# Patient Record
Sex: MALE | Race: WHITE | NOT HISPANIC OR LATINO
[De-identification: names, ages, dates, MRNs, and addresses within clinical notes are randomized per-mention and may not be internally consistent; named-entity substitution may affect disease eponyms.]

---

## 2020-02-05 PROBLEM — Z00.00 ENCOUNTER FOR PREVENTIVE HEALTH EXAMINATION: Status: ACTIVE | Noted: 2020-02-05

## 2020-02-25 ENCOUNTER — APPOINTMENT (OUTPATIENT)
Dept: FAMILY MEDICINE | Facility: CLINIC | Age: 55
End: 2020-02-25

## 2023-03-25 ENCOUNTER — EMERGENCY (EMERGENCY)
Facility: HOSPITAL | Age: 58
LOS: 1 days | Discharge: HOME | End: 2023-03-25
Attending: EMERGENCY MEDICINE
Payer: COMMERCIAL

## 2023-03-25 VITALS
DIASTOLIC BLOOD PRESSURE: 67 MMHG | RESPIRATION RATE: 20 BRPM | HEART RATE: 81 BPM | TEMPERATURE: 98 F | OXYGEN SATURATION: 95 % | SYSTOLIC BLOOD PRESSURE: 127 MMHG

## 2023-03-25 VITALS
HEART RATE: 114 BPM | SYSTOLIC BLOOD PRESSURE: 129 MMHG | TEMPERATURE: 99 F | RESPIRATION RATE: 20 BRPM | WEIGHT: 164.91 LBS | HEIGHT: 69 IN | DIASTOLIC BLOOD PRESSURE: 82 MMHG

## 2023-03-25 LAB
ALBUMIN SERPL ELPH-MCNC: 4.5 G/DL — SIGNIFICANT CHANGE UP (ref 3.3–5.2)
ALP SERPL-CCNC: 61 U/L — SIGNIFICANT CHANGE UP (ref 40–120)
ALT FLD-CCNC: 16 U/L — SIGNIFICANT CHANGE UP
ANION GAP SERPL CALC-SCNC: 12 MMOL/L — SIGNIFICANT CHANGE UP (ref 5–17)
APPEARANCE UR: CLEAR — SIGNIFICANT CHANGE UP
APTT BLD: 23.5 SEC — LOW (ref 27.5–35.5)
AST SERPL-CCNC: 27 U/L — SIGNIFICANT CHANGE UP
BACTERIA # UR AUTO: NEGATIVE — SIGNIFICANT CHANGE UP
BASOPHILS # BLD AUTO: 0.09 K/UL — SIGNIFICANT CHANGE UP (ref 0–0.2)
BASOPHILS NFR BLD AUTO: 0.9 % — SIGNIFICANT CHANGE UP (ref 0–2)
BILIRUB SERPL-MCNC: 0.4 MG/DL — SIGNIFICANT CHANGE UP (ref 0.4–2)
BILIRUB UR-MCNC: NEGATIVE — SIGNIFICANT CHANGE UP
BLD GP AB SCN SERPL QL: SIGNIFICANT CHANGE UP
BUN SERPL-MCNC: 13.4 MG/DL — SIGNIFICANT CHANGE UP (ref 8–20)
CALCIUM SERPL-MCNC: 9.2 MG/DL — SIGNIFICANT CHANGE UP (ref 8.4–10.5)
CHLORIDE SERPL-SCNC: 101 MMOL/L — SIGNIFICANT CHANGE UP (ref 96–108)
CO2 SERPL-SCNC: 22 MMOL/L — SIGNIFICANT CHANGE UP (ref 22–29)
COLOR SPEC: YELLOW — SIGNIFICANT CHANGE UP
CREAT SERPL-MCNC: 0.76 MG/DL — SIGNIFICANT CHANGE UP (ref 0.5–1.3)
DIFF PNL FLD: NEGATIVE — SIGNIFICANT CHANGE UP
EGFR: 105 ML/MIN/1.73M2 — SIGNIFICANT CHANGE UP
EOSINOPHIL # BLD AUTO: 0.76 K/UL — HIGH (ref 0–0.5)
EOSINOPHIL NFR BLD AUTO: 7.5 % — HIGH (ref 0–6)
EPI CELLS # UR: SIGNIFICANT CHANGE UP
FLUAV AG NPH QL: SIGNIFICANT CHANGE UP
FLUBV AG NPH QL: SIGNIFICANT CHANGE UP
GLUCOSE SERPL-MCNC: 74 MG/DL — SIGNIFICANT CHANGE UP (ref 70–99)
GLUCOSE UR QL: NEGATIVE MG/DL — SIGNIFICANT CHANGE UP
HCT VFR BLD CALC: 40.6 % — SIGNIFICANT CHANGE UP (ref 39–50)
HGB BLD-MCNC: 13.8 G/DL — SIGNIFICANT CHANGE UP (ref 13–17)
IMM GRANULOCYTES NFR BLD AUTO: 0.2 % — SIGNIFICANT CHANGE UP (ref 0–0.9)
INR BLD: 1.07 RATIO — SIGNIFICANT CHANGE UP (ref 0.88–1.16)
KETONES UR-MCNC: ABNORMAL
LEUKOCYTE ESTERASE UR-ACNC: ABNORMAL
LIDOCAIN IGE QN: 51 U/L — SIGNIFICANT CHANGE UP (ref 22–51)
LYMPHOCYTES # BLD AUTO: 2.41 K/UL — SIGNIFICANT CHANGE UP (ref 1–3.3)
LYMPHOCYTES # BLD AUTO: 23.8 % — SIGNIFICANT CHANGE UP (ref 13–44)
MCHC RBC-ENTMCNC: 30.8 PG — SIGNIFICANT CHANGE UP (ref 27–34)
MCHC RBC-ENTMCNC: 34 GM/DL — SIGNIFICANT CHANGE UP (ref 32–36)
MCV RBC AUTO: 90.6 FL — SIGNIFICANT CHANGE UP (ref 80–100)
MONOCYTES # BLD AUTO: 0.88 K/UL — SIGNIFICANT CHANGE UP (ref 0–0.9)
MONOCYTES NFR BLD AUTO: 8.7 % — SIGNIFICANT CHANGE UP (ref 2–14)
NEUTROPHILS # BLD AUTO: 5.96 K/UL — SIGNIFICANT CHANGE UP (ref 1.8–7.4)
NEUTROPHILS NFR BLD AUTO: 58.9 % — SIGNIFICANT CHANGE UP (ref 43–77)
NITRITE UR-MCNC: NEGATIVE — SIGNIFICANT CHANGE UP
PH UR: 5 — SIGNIFICANT CHANGE UP (ref 5–8)
PLATELET # BLD AUTO: 331 K/UL — SIGNIFICANT CHANGE UP (ref 150–400)
POTASSIUM SERPL-MCNC: 3.8 MMOL/L — SIGNIFICANT CHANGE UP (ref 3.5–5.3)
POTASSIUM SERPL-SCNC: 3.8 MMOL/L — SIGNIFICANT CHANGE UP (ref 3.5–5.3)
PROT SERPL-MCNC: 6.9 G/DL — SIGNIFICANT CHANGE UP (ref 6.6–8.7)
PROT UR-MCNC: 15
PROTHROM AB SERPL-ACNC: 12.4 SEC — SIGNIFICANT CHANGE UP (ref 10.5–13.4)
RBC # BLD: 4.48 M/UL — SIGNIFICANT CHANGE UP (ref 4.2–5.8)
RBC # FLD: 13.5 % — SIGNIFICANT CHANGE UP (ref 10.3–14.5)
RBC CASTS # UR COMP ASSIST: SIGNIFICANT CHANGE UP /HPF (ref 0–4)
RSV RNA NPH QL NAA+NON-PROBE: SIGNIFICANT CHANGE UP
SARS-COV-2 RNA SPEC QL NAA+PROBE: SIGNIFICANT CHANGE UP
SODIUM SERPL-SCNC: 135 MMOL/L — SIGNIFICANT CHANGE UP (ref 135–145)
SP GR SPEC: 1.02 — SIGNIFICANT CHANGE UP (ref 1.01–1.02)
TROPONIN T SERPL-MCNC: <0.01 NG/ML — SIGNIFICANT CHANGE UP (ref 0–0.06)
UROBILINOGEN FLD QL: 1 MG/DL
WBC # BLD: 10.12 K/UL — SIGNIFICANT CHANGE UP (ref 3.8–10.5)
WBC # FLD AUTO: 10.12 K/UL — SIGNIFICANT CHANGE UP (ref 3.8–10.5)
WBC UR QL: SIGNIFICANT CHANGE UP /HPF (ref 0–5)

## 2023-03-25 PROCEDURE — 71045 X-RAY EXAM CHEST 1 VIEW: CPT

## 2023-03-25 PROCEDURE — 99285 EMERGENCY DEPT VISIT HI MDM: CPT | Mod: 25

## 2023-03-25 PROCEDURE — 99284 EMERGENCY DEPT VISIT MOD MDM: CPT

## 2023-03-25 PROCEDURE — 81001 URINALYSIS AUTO W/SCOPE: CPT

## 2023-03-25 PROCEDURE — 96375 TX/PRO/DX INJ NEW DRUG ADDON: CPT

## 2023-03-25 PROCEDURE — 96361 HYDRATE IV INFUSION ADD-ON: CPT

## 2023-03-25 PROCEDURE — 86900 BLOOD TYPING SEROLOGIC ABO: CPT

## 2023-03-25 PROCEDURE — 80053 COMPREHEN METABOLIC PANEL: CPT

## 2023-03-25 PROCEDURE — 87637 SARSCOV2&INF A&B&RSV AMP PRB: CPT

## 2023-03-25 PROCEDURE — 96374 THER/PROPH/DIAG INJ IV PUSH: CPT | Mod: XU

## 2023-03-25 PROCEDURE — 93010 ELECTROCARDIOGRAM REPORT: CPT

## 2023-03-25 PROCEDURE — 36415 COLL VENOUS BLD VENIPUNCTURE: CPT

## 2023-03-25 PROCEDURE — 85025 COMPLETE CBC W/AUTO DIFF WBC: CPT

## 2023-03-25 PROCEDURE — 93005 ELECTROCARDIOGRAM TRACING: CPT

## 2023-03-25 PROCEDURE — 74177 CT ABD & PELVIS W/CONTRAST: CPT | Mod: MC

## 2023-03-25 PROCEDURE — 86901 BLOOD TYPING SEROLOGIC RH(D): CPT

## 2023-03-25 PROCEDURE — 84484 ASSAY OF TROPONIN QUANT: CPT

## 2023-03-25 PROCEDURE — 86850 RBC ANTIBODY SCREEN: CPT

## 2023-03-25 PROCEDURE — 85610 PROTHROMBIN TIME: CPT

## 2023-03-25 PROCEDURE — 71045 X-RAY EXAM CHEST 1 VIEW: CPT | Mod: 26

## 2023-03-25 PROCEDURE — 85730 THROMBOPLASTIN TIME PARTIAL: CPT

## 2023-03-25 PROCEDURE — 87086 URINE CULTURE/COLONY COUNT: CPT

## 2023-03-25 PROCEDURE — 83690 ASSAY OF LIPASE: CPT

## 2023-03-25 PROCEDURE — 74177 CT ABD & PELVIS W/CONTRAST: CPT | Mod: 26,MC

## 2023-03-25 RX ORDER — SODIUM CHLORIDE 9 MG/ML
1000 INJECTION INTRAMUSCULAR; INTRAVENOUS; SUBCUTANEOUS ONCE
Refills: 0 | Status: COMPLETED | OUTPATIENT
Start: 2023-03-25 | End: 2023-03-25

## 2023-03-25 RX ORDER — IOHEXOL 300 MG/ML
30 INJECTION, SOLUTION INTRAVENOUS ONCE
Refills: 0 | Status: COMPLETED | OUTPATIENT
Start: 2023-03-25 | End: 2023-03-25

## 2023-03-25 RX ORDER — ONDANSETRON 8 MG/1
4 TABLET, FILM COATED ORAL ONCE
Refills: 0 | Status: COMPLETED | OUTPATIENT
Start: 2023-03-25 | End: 2023-03-25

## 2023-03-25 RX ORDER — KETOROLAC TROMETHAMINE 30 MG/ML
15 SYRINGE (ML) INJECTION ONCE
Refills: 0 | Status: DISCONTINUED | OUTPATIENT
Start: 2023-03-25 | End: 2023-03-25

## 2023-03-25 RX ORDER — OXYCODONE AND ACETAMINOPHEN 5; 325 MG/1; MG/1
1 TABLET ORAL
Qty: 12 | Refills: 0
Start: 2023-03-25 | End: 2023-03-27

## 2023-03-25 RX ADMIN — Medication 15 MILLIGRAM(S): at 21:56

## 2023-03-25 RX ADMIN — IOHEXOL 30 MILLILITER(S): 300 INJECTION, SOLUTION INTRAVENOUS at 20:22

## 2023-03-25 RX ADMIN — Medication 15 MILLIGRAM(S): at 20:03

## 2023-03-25 RX ADMIN — SODIUM CHLORIDE 1000 MILLILITER(S): 9 INJECTION INTRAMUSCULAR; INTRAVENOUS; SUBCUTANEOUS at 21:56

## 2023-03-25 RX ADMIN — SODIUM CHLORIDE 1000 MILLILITER(S): 9 INJECTION INTRAMUSCULAR; INTRAVENOUS; SUBCUTANEOUS at 20:03

## 2023-03-25 RX ADMIN — ONDANSETRON 4 MILLIGRAM(S): 8 TABLET, FILM COATED ORAL at 20:03

## 2023-03-25 NOTE — ED PROVIDER NOTE - NSFOLLOWUPINSTRUCTIONS_ED_ALL_ED_FT
Percocet 5/325 mg 1 tablet every 6 hrs for moderate to severe pain  Follow up with your surgeon as soon as possible  Return sooner for any worsening symptoms     Hernia    A hernia is when fat or the intestines push through a weak area in the abdominal wall. This can occur around the belly button (umbilical hernia) or where the leg meets the lower abdomen (inguinal hernia). This creates a rounded lump (bulge). Hernias that can be pushed back into the belly are called reducible and those that cannot are called incarcerated. Hernias that are not reducible and lose their blood supply are called strangulated and require emergency surgery. Hernias can be caused or worsened when straining during bowel movements or lifting heavy objects as well as coughing or sneezing. Surgery may be helpful in treating this condition so follow up with a surgeon.    SEEK IMMEDIATE MEDICAL CARE IF YOU HAVE ANY OF THE FOLLOWING SYMPTOMS: worsening abdominal pain, change in color over the hernia, nausea/vomiting, or inability to have a bowel movement or pass gas.

## 2023-03-25 NOTE — ED ADULT TRIAGE NOTE - NSWEIGHTCALCTOOLDRUG_GEN_A_CORE
October 9, 2018      AILYN Robles Jr., MD  [address]  [address]  [facility] [address]           [facility] [address]  [facility] [address]  Phone: [phone]  Fax: [phone]          Patient: Petey Webber   MR Number: [id number]   YOB: 1951   Date of Visit: 10/9/2018       Dear Dr. AILYN Robles Jr.:    Thank you for referring Petey Webber to me for evaluation. Attached you will find relevant portions of my assessment and plan of care.    If you have questions, please do not hesitate to call me. I look forward to following Petey Webber along with you.    Sincerely,    Lm Laureano MD    Enclosure  CC:  No Recipients    If you would like to receive this communication electronically, please contact [email or web address] or [phone] to request more information on [employer] access.    For providers and/or their staff who would like to refer a patient to [facility], please contact us through our one-stop-shop provider referral line, [facility], at [phone].    If you feel you have received this communication in error or would no longer like to receive these types of communications, please e-mail [email or web address]           used

## 2023-03-25 NOTE — ED ADULT NURSE NOTE - NS ED NURSE RECORD ANOTHER VITAL SIGN
Resolved  checked doppler negative  checked pre albumin low consulted nutrition check doppler   check pre albumin  gave lasix prn Resolved  checked doppler negative  checked pre albumin low consulted nutrition Resolved  checked doppler negative  checked pre albumin low consulted nutrition Resolved  checked doppler negative  checked pre albumin low consulted nutrition Resolved  checked doppler negative  checked pre albumin low consulted nutrition checked doppler negative   checked pre albumin low consulted nutrition  give lasix prn checked doppler negative   checked pre albumin low consulted nutrition  give lasix prn checked doppler negative   checked pre albumin low consulted nutrition  give lasix prn checked doppler negative   checked pre albumin low consulted nutrition  give lasix prn checked doppler negative   checked pre albumin low consulted nutrition  give lasix prn checked doppler negative   checked pre albumin low consulted nutrition  give lasix prn checked doppler negative   checked pre albumin low consulted nutrition  give lasix prn checked doppler negative   checked pre albumin low consulted nutrition  give lasix prn checked doppler negative   checked pre albumin low consulted nutrition  give lasix prn checked doppler negative   checked pre albumin low consulted nutrition  give lasix prn checked doppler negative   checked pre albumin low consulted nutrition  give lasix prn checked doppler negative   checked pre albumin low consulted nutrition  give lasix prn Resolved  checked doppler negative  checked pre albumin low consulted nutrition Yes

## 2023-03-25 NOTE — ED PROVIDER NOTE - PRINCIPAL DIAGNOSIS
- Continue chemotherapy as per [id number] s/p induction, s/p azacitidine x5 days  - Consolidation day 16 Inguinal hernia

## 2023-03-25 NOTE — ED PROVIDER NOTE - PATIENT PORTAL LINK FT
You can access the FollowMyHealth Patient Portal offered by [facility] by registering at the following website: [email or web address]. By joining [employer]’s FollowMyHealth portal, you will also be able to view your health information using other applications (apps) compatible with our system. You can access the FollowMyHealth Patient Portal offered by [facility] by registering at the following website: [email or web address]. By joining [employer]’s FollowMyHealth portal, you will also be able to view your health information using other applications (apps) compatible with our system. You can access the FollowMyHealth Patient Portal offered by [facility] by registering at the following website: [email or web address]. By joining [employer]’s FollowMyHealth portal, you will also be able to view your health information using other applications (apps) compatible with our system.

## 2023-03-25 NOTE — ED ADULT NURSE NOTE - NSIMPLEMENTINTERV_GEN_ALL_ED
Implemented All Universal Safety Interventions:  [place] to call system. Call bell, personal items and telephone within reach. Instruct patient to call for assistance. Room bathroom lighting operational. Non-slip footwear when patient is off stretcher. Physically safe environment: no spills, clutter or unnecessary equipment. Stretcher in lowest position, wheels locked, appropriate side rails in place. Implemented All Universal Safety Interventions:  [place] to call system. Call bell, personal items and telephone within reach. Instruct patient to call for assistance. Room bathroom lighting operational. Non-slip footwear when patient is off stretcher. Physically safe environment: no spills, clutter or unnecessary equipment. Stretcher in lowest position, wheels locked, appropriate side rails in place. Implemented All Universal Safety Interventions:  [place] to call system. Call bell, personal items and telephone within reach. Instruct patient to call for assistance. Room bathroom lighting operational. Non-slip footwear when patient is off stretcher. Physically safe environment: no spills, clutter or unnecessary equipment. Stretcher in lowest position, wheels locked, appropriate side rails in place.

## 2023-03-25 NOTE — ED PROVIDER NOTE - CLINICAL SUMMARY MEDICAL DECISION MAKING FREE TEXT BOX
58y Male with RLQ pain and LLQ pain in the setting of right inguinal hernia repair (2022). No chest pain, palpitations, fever, shortness of breath. On examination, abdomen is soft, non-distended, RLQ ttp over superficial mass that is reducible. Differential diagnosis includes but not limited to hernia recurrence, obstruction, muscle sprain/strain.

## 2023-03-25 NOTE — ED PROVIDER NOTE - OBJECTIVE STATEMENT
58y Male with history of right inguinal hernia repair (2022) presenting with sudden onset RLQ pain since yesterday with LLQ pain that started 1 week ago. Denies injuries or trauma. Denies fevers, chills, headache, chest pain, palpitations, shortness of breath, cough, nausea, vomiting, diarrhea, hematuria, dysuria, dark stools, focal neurologic symptoms.

## 2023-03-25 NOTE — ED PROVIDER NOTE - PROGRESS NOTE DETAILS
CT results as noted and results reviewed with pt.  Pt still c/o pain.  Will d/c on Percocet and pt understands he needs to follow up with the surgeon that performed his surgery as an outpatient

## 2023-03-25 NOTE — ED PROVIDER NOTE - PHYSICAL EXAMINATION
General: nontoxic appearing in no acute distress, alert and cooperative  Head: Normocephalic, atraumatic  Eyes: PERRLA, no conjunctival injection, no scleral icterus, EOMI  ENMT: Atraumatic external nose and ears  Neck: Soft and supple, full ROM without pain  Cardiac: Regular rate and regular rhythm, no murmurs, no LE edema.  Resp: Unlabored respiratory effort, lungs CTAB  Abd: Soft, non-distended, RLQ ttp over superficial mass that is reducible  MSK: Spine midline and non-tender  Skin: Warm and dry  Neuro: AO x 3, moves all extremities symmetrically, Motor strength 5/5 bilaterally UE and LE, sensation grossly intact

## 2023-03-26 LAB
CULTURE RESULTS: SIGNIFICANT CHANGE UP
SPECIMEN SOURCE: SIGNIFICANT CHANGE UP

## 2023-03-29 ENCOUNTER — EMERGENCY (EMERGENCY)
Facility: HOSPITAL | Age: 58
LOS: 1 days | End: 2023-03-29
Attending: EMERGENCY MEDICINE
Payer: COMMERCIAL

## 2023-03-29 PROCEDURE — 70487 CT MAXILLOFACIAL W/DYE: CPT

## 2023-03-29 PROCEDURE — 71045 X-RAY EXAM CHEST 1 VIEW: CPT

## 2023-03-29 PROCEDURE — 70496 CT ANGIOGRAPHY HEAD: CPT

## 2023-03-29 PROCEDURE — 71045 X-RAY EXAM CHEST 1 VIEW: CPT | Mod: 26

## 2023-03-29 PROCEDURE — 99284 EMERGENCY DEPT VISIT MOD MDM: CPT

## 2023-03-29 PROCEDURE — 70450 CT HEAD/BRAIN W/O DYE: CPT

## 2023-03-29 PROCEDURE — 96375 TX/PRO/DX INJ NEW DRUG ADDON: CPT | Mod: XU

## 2023-03-29 PROCEDURE — 96361 HYDRATE IV INFUSION ADD-ON: CPT

## 2023-03-29 PROCEDURE — 70487 CT MAXILLOFACIAL W/DYE: CPT | Mod: 26

## 2023-03-29 PROCEDURE — 70496 CT ANGIOGRAPHY HEAD: CPT | Mod: 26

## 2023-03-29 PROCEDURE — 70450 CT HEAD/BRAIN W/O DYE: CPT | Mod: 26,59

## 2023-03-29 PROCEDURE — 99284 EMERGENCY DEPT VISIT MOD MDM: CPT | Mod: 25

## 2023-03-29 PROCEDURE — 96374 THER/PROPH/DIAG INJ IV PUSH: CPT | Mod: XU

## 2023-03-29 NOTE — ED PROVIDER NOTE - COVID-19 ORDERING FACILITY
[facility]

## 2023-03-29 NOTE — ED PROVIDER NOTE - PROGRESS NOTE DETAILS
Patient signed out to incoming physician, Dr. Zacarias pending all labs results and CT results and tx.  All decisions regarding the progression of care will be made at their discretion. The care of this patient occurred during a computer downtime. Please see both the paper chart and this electronic chart for complete information. This attestation applies to both the paper chart and the electronic medical record.    start time for NaCl 1535  dx: facial pain

## 2023-04-06 ENCOUNTER — TRANSCRIPTION ENCOUNTER (OUTPATIENT)
Age: 58
End: 2023-04-06

## 2023-04-06 ENCOUNTER — INPATIENT (INPATIENT)
Facility: HOSPITAL | Age: 58
LOS: 0 days | Discharge: HOME | DRG: 312 | End: 2023-04-07
Attending: HOSPITALIST | Admitting: HOSPITALIST
Payer: COMMERCIAL

## 2023-04-06 VITALS
RESPIRATION RATE: 18 BRPM | HEART RATE: 83 BPM | HEIGHT: 69 IN | DIASTOLIC BLOOD PRESSURE: 90 MMHG | OXYGEN SATURATION: 97 % | SYSTOLIC BLOOD PRESSURE: 142 MMHG | TEMPERATURE: 99 F

## 2023-04-06 DIAGNOSIS — I63.9 CEREBRAL INFARCTION, UNSPECIFIED: ICD-10-CM

## 2023-04-06 DIAGNOSIS — Z98.890 OTHER SPECIFIED POSTPROCEDURAL STATES: Chronic | ICD-10-CM

## 2023-04-06 LAB
ALBUMIN SERPL ELPH-MCNC: 4.4 G/DL — SIGNIFICANT CHANGE UP (ref 3.3–5.2)
ALP SERPL-CCNC: 66 U/L — SIGNIFICANT CHANGE UP (ref 40–120)
ALT FLD-CCNC: 20 U/L — SIGNIFICANT CHANGE UP
AMPHET UR-MCNC: NEGATIVE — SIGNIFICANT CHANGE UP
ANION GAP SERPL CALC-SCNC: 11 MMOL/L — SIGNIFICANT CHANGE UP (ref 5–17)
APPEARANCE UR: CLEAR — SIGNIFICANT CHANGE UP
APTT BLD: 27 SEC — LOW (ref 27.5–35.5)
AST SERPL-CCNC: 31 U/L — SIGNIFICANT CHANGE UP
BACTERIA # UR AUTO: ABNORMAL
BARBITURATES UR SCN-MCNC: NEGATIVE — SIGNIFICANT CHANGE UP
BASE EXCESS BLDV CALC-SCNC: -2.1 MMOL/L — LOW (ref -2–3)
BASOPHILS # BLD AUTO: 0.06 K/UL — SIGNIFICANT CHANGE UP (ref 0–0.2)
BASOPHILS NFR BLD AUTO: 0.4 % — SIGNIFICANT CHANGE UP (ref 0–2)
BENZODIAZ UR-MCNC: NEGATIVE — SIGNIFICANT CHANGE UP
BILIRUB SERPL-MCNC: <0.2 MG/DL — LOW (ref 0.4–2)
BILIRUB UR-MCNC: NEGATIVE — SIGNIFICANT CHANGE UP
BLD GP AB SCN SERPL QL: SIGNIFICANT CHANGE UP
BUN SERPL-MCNC: 16.7 MG/DL — SIGNIFICANT CHANGE UP (ref 8–20)
CA-I SERPL-SCNC: 1.14 MMOL/L — LOW (ref 1.15–1.33)
CALCIUM SERPL-MCNC: 8.6 MG/DL — SIGNIFICANT CHANGE UP (ref 8.4–10.5)
CHLORIDE BLDV-SCNC: 107 MMOL/L — SIGNIFICANT CHANGE UP (ref 96–108)
CHLORIDE SERPL-SCNC: 104 MMOL/L — SIGNIFICANT CHANGE UP (ref 96–108)
CK MB CFR SERPL CALC: 5.7 NG/ML — SIGNIFICANT CHANGE UP (ref 0–6.7)
CK SERPL-CCNC: 335 U/L — HIGH (ref 30–200)
CO2 SERPL-SCNC: 23 MMOL/L — SIGNIFICANT CHANGE UP (ref 22–29)
COCAINE METAB.OTHER UR-MCNC: POSITIVE
COLOR SPEC: YELLOW — SIGNIFICANT CHANGE UP
CREAT SERPL-MCNC: 0.87 MG/DL — SIGNIFICANT CHANGE UP (ref 0.5–1.3)
DIFF PNL FLD: NEGATIVE — SIGNIFICANT CHANGE UP
EGFR: 101 ML/MIN/1.73M2 — SIGNIFICANT CHANGE UP
EOSINOPHIL # BLD AUTO: 0.1 K/UL — SIGNIFICANT CHANGE UP (ref 0–0.5)
EOSINOPHIL NFR BLD AUTO: 0.7 % — SIGNIFICANT CHANGE UP (ref 0–6)
EPI CELLS # UR: SIGNIFICANT CHANGE UP
ETHANOL SERPL-MCNC: <10 MG/DL — SIGNIFICANT CHANGE UP (ref 0–9)
FLUAV AG NPH QL: SIGNIFICANT CHANGE UP
FLUBV AG NPH QL: SIGNIFICANT CHANGE UP
GAS PNL BLDV: 136 MMOL/L — SIGNIFICANT CHANGE UP (ref 136–145)
GAS PNL BLDV: SIGNIFICANT CHANGE UP
GLUCOSE BLDV-MCNC: 117 MG/DL — HIGH (ref 70–99)
GLUCOSE SERPL-MCNC: 117 MG/DL — HIGH (ref 70–99)
GLUCOSE UR QL: 100 MG/DL
HCO3 BLDV-SCNC: 25 MMOL/L — SIGNIFICANT CHANGE UP (ref 22–29)
HCT VFR BLD CALC: 39.1 % — SIGNIFICANT CHANGE UP (ref 39–50)
HCT VFR BLDA CALC: 40 % — SIGNIFICANT CHANGE UP
HGB BLD CALC-MCNC: 13.3 G/DL — SIGNIFICANT CHANGE UP (ref 12.6–17.4)
HGB BLD-MCNC: 12.7 G/DL — LOW (ref 13–17)
IMM GRANULOCYTES NFR BLD AUTO: 0.4 % — SIGNIFICANT CHANGE UP (ref 0–0.9)
INR BLD: 0.96 RATIO — SIGNIFICANT CHANGE UP (ref 0.88–1.16)
KETONES UR-MCNC: ABNORMAL
LACTATE BLDV-MCNC: 1.5 MMOL/L — SIGNIFICANT CHANGE UP (ref 0.5–2)
LEUKOCYTE ESTERASE UR-ACNC: NEGATIVE — SIGNIFICANT CHANGE UP
LYMPHOCYTES # BLD AUTO: 0.7 K/UL — LOW (ref 1–3.3)
LYMPHOCYTES # BLD AUTO: 4.8 % — LOW (ref 13–44)
MCHC RBC-ENTMCNC: 30.7 PG — SIGNIFICANT CHANGE UP (ref 27–34)
MCHC RBC-ENTMCNC: 32.5 GM/DL — SIGNIFICANT CHANGE UP (ref 32–36)
MCV RBC AUTO: 94.4 FL — SIGNIFICANT CHANGE UP (ref 80–100)
METHADONE UR-MCNC: NEGATIVE — SIGNIFICANT CHANGE UP
MONOCYTES # BLD AUTO: 0.74 K/UL — SIGNIFICANT CHANGE UP (ref 0–0.9)
MONOCYTES NFR BLD AUTO: 5.1 % — SIGNIFICANT CHANGE UP (ref 2–14)
NEUTROPHILS # BLD AUTO: 12.91 K/UL — HIGH (ref 1.8–7.4)
NEUTROPHILS NFR BLD AUTO: 88.6 % — HIGH (ref 43–77)
NITRITE UR-MCNC: NEGATIVE — SIGNIFICANT CHANGE UP
OPIATES UR-MCNC: NEGATIVE — SIGNIFICANT CHANGE UP
PCO2 BLDV: 59 MMHG — HIGH (ref 42–55)
PCP SPEC-MCNC: SIGNIFICANT CHANGE UP
PCP UR-MCNC: NEGATIVE — SIGNIFICANT CHANGE UP
PH BLDV: 7.24 — LOW (ref 7.32–7.43)
PH UR: 7 — SIGNIFICANT CHANGE UP (ref 5–8)
PLATELET # BLD AUTO: 370 K/UL — SIGNIFICANT CHANGE UP (ref 150–400)
PO2 BLDV: 62 MMHG — HIGH (ref 25–45)
POTASSIUM BLDV-SCNC: 4.2 MMOL/L — SIGNIFICANT CHANGE UP (ref 3.5–5.1)
POTASSIUM SERPL-MCNC: 4.1 MMOL/L — SIGNIFICANT CHANGE UP (ref 3.5–5.3)
POTASSIUM SERPL-SCNC: 4.1 MMOL/L — SIGNIFICANT CHANGE UP (ref 3.5–5.3)
PROT SERPL-MCNC: 6.9 G/DL — SIGNIFICANT CHANGE UP (ref 6.6–8.7)
PROT UR-MCNC: 30 MG/DL
PROTHROM AB SERPL-ACNC: 11.1 SEC — SIGNIFICANT CHANGE UP (ref 10.5–13.4)
RBC # BLD: 4.14 M/UL — LOW (ref 4.2–5.8)
RBC # FLD: 13.8 % — SIGNIFICANT CHANGE UP (ref 10.3–14.5)
RBC CASTS # UR COMP ASSIST: SIGNIFICANT CHANGE UP /HPF (ref 0–4)
RSV RNA NPH QL NAA+NON-PROBE: SIGNIFICANT CHANGE UP
SAO2 % BLDV: 89.6 % — SIGNIFICANT CHANGE UP
SARS-COV-2 RNA SPEC QL NAA+PROBE: SIGNIFICANT CHANGE UP
SODIUM SERPL-SCNC: 138 MMOL/L — SIGNIFICANT CHANGE UP (ref 135–145)
SP GR SPEC: 1.01 — SIGNIFICANT CHANGE UP (ref 1.01–1.02)
THC UR QL: NEGATIVE — SIGNIFICANT CHANGE UP
TROPONIN T SERPL-MCNC: <0.01 NG/ML — SIGNIFICANT CHANGE UP (ref 0–0.06)
UROBILINOGEN FLD QL: NEGATIVE MG/DL — SIGNIFICANT CHANGE UP
WBC # BLD: 14.57 K/UL — HIGH (ref 3.8–10.5)
WBC # FLD AUTO: 14.57 K/UL — HIGH (ref 3.8–10.5)
WBC UR QL: SIGNIFICANT CHANGE UP /HPF (ref 0–5)

## 2023-04-06 PROCEDURE — 99222 1ST HOSP IP/OBS MODERATE 55: CPT

## 2023-04-06 PROCEDURE — 70496 CT ANGIOGRAPHY HEAD: CPT | Mod: 26,MA

## 2023-04-06 PROCEDURE — 99291 CRITICAL CARE FIRST HOUR: CPT

## 2023-04-06 PROCEDURE — 93010 ELECTROCARDIOGRAM REPORT: CPT

## 2023-04-06 PROCEDURE — 70498 CT ANGIOGRAPHY NECK: CPT | Mod: 26,MA

## 2023-04-06 PROCEDURE — 0042T: CPT | Mod: MA

## 2023-04-06 RX ORDER — SODIUM CHLORIDE 9 MG/ML
1000 INJECTION, SOLUTION INTRAVENOUS ONCE
Refills: 0 | Status: COMPLETED | OUTPATIENT
Start: 2023-04-06 | End: 2023-04-06

## 2023-04-06 RX ORDER — ACETAMINOPHEN 500 MG
650 TABLET ORAL EVERY 6 HOURS
Refills: 0 | Status: DISCONTINUED | OUTPATIENT
Start: 2023-04-06 | End: 2023-04-07

## 2023-04-06 RX ORDER — ONDANSETRON 8 MG/1
4 TABLET, FILM COATED ORAL ONCE
Refills: 0 | Status: COMPLETED | OUTPATIENT
Start: 2023-04-06 | End: 2023-04-06

## 2023-04-06 RX ORDER — ASPIRIN/CALCIUM CARB/MAGNESIUM 324 MG
81 TABLET ORAL ONCE
Refills: 0 | Status: COMPLETED | OUTPATIENT
Start: 2023-04-06 | End: 2023-04-06

## 2023-04-06 RX ORDER — ATORVASTATIN CALCIUM 80 MG/1
40 TABLET, FILM COATED ORAL AT BEDTIME
Refills: 0 | Status: DISCONTINUED | OUTPATIENT
Start: 2023-04-06 | End: 2023-04-07

## 2023-04-06 RX ORDER — LANOLIN ALCOHOL/MO/W.PET/CERES
3 CREAM (GRAM) TOPICAL AT BEDTIME
Refills: 0 | Status: DISCONTINUED | OUTPATIENT
Start: 2023-04-06 | End: 2023-04-07

## 2023-04-06 RX ORDER — ASPIRIN/CALCIUM CARB/MAGNESIUM 324 MG
81 TABLET ORAL DAILY
Refills: 0 | Status: DISCONTINUED | OUTPATIENT
Start: 2023-04-06 | End: 2023-04-07

## 2023-04-06 RX ORDER — ONDANSETRON 8 MG/1
4 TABLET, FILM COATED ORAL EVERY 8 HOURS
Refills: 0 | Status: DISCONTINUED | OUTPATIENT
Start: 2023-04-06 | End: 2023-04-07

## 2023-04-06 RX ADMIN — SODIUM CHLORIDE 1000 MILLILITER(S): 9 INJECTION, SOLUTION INTRAVENOUS at 22:09

## 2023-04-06 RX ADMIN — Medication 81 MILLIGRAM(S): at 22:07

## 2023-04-06 RX ADMIN — ONDANSETRON 4 MILLIGRAM(S): 8 TABLET, FILM COATED ORAL at 21:02

## 2023-04-06 NOTE — ED ADULT TRIAGE NOTE - NS ED NURSE AMBULANCES
[facility]

## 2023-04-06 NOTE — ED ADULT TRIAGE NOTE - CHIEF COMPLAINT QUOTE
BIBEMS form home s/p mechanical slip and fall in the shower this AM. Pt endorses LOC. Denies anticoagulation medications, c/o neck and back pain and a headache. BIBEMS form home s/p fall in the shower this AM. Pt endorses LOC. Denies anticoagulation medications, c/o neck and back pain, nausea and a headache. BIBEMS from home s/p fall in the shower this AM, in which pt hit his head. Pt endorses LOC. Denies anticoagulation medications, c/o neck and back pain, nausea and a headache.

## 2023-04-06 NOTE — ED ADULT NURSE NOTE - NSIMPLEMENTINTERV_GEN_ALL_ED
Implemented All Fall Risk Interventions:  [place] to call system. Call bell, personal items and telephone within reach. Instruct patient to call for assistance. Room bathroom lighting operational. Non-slip footwear when patient is off stretcher. Physically safe environment: no spills, clutter or unnecessary equipment. Stretcher in lowest position, wheels locked, appropriate side rails in place. Provide visual cue, wrist band, yellow gown, etc. Monitor gait and stability. Monitor for mental status changes and reorient to person, place, and time. Review medications for side effects contributing to fall risk. Reinforce activity limits and safety measures with patient and family. Implemented All Fall Risk Interventions:  [place] to call system. Call bell, personal items and telephone within reach. Instruct patient to call for assistance. Room bathroom lighting operational. Non-slip footwear when patient is off stretcher. Physically safe environment: no spills, clutter or unnecessary equipment. Stretcher in lowest position, wheels locked, appropriate side rails in place. Provide visual cue, wrist band, yellow gown, etc. Monitor gait and stability. Monitor for mental status changes and reorient to person, place, and time. Review medications for side effects contributing to fall risk. Reinforce activity limits and safety measures with patient and family. Implemented All Fall Risk Interventions:  [place] to call system. Call bell, personal items and telephone within reach. Instruct patient to call for assistance. Room bathroom lighting operational. Non-slip footwear when patient is off stretcher. Physically safe environment: no spills, clutter or unnecessary equipment. Stretcher in lowest position, wheels locked, appropriate side rails in place. Provide visual cue, wrist band, yellow gown, etc. Monitor gait and stability. Monitor for mental status changes and reorient to person, place, and time. Review medications for side effects contributing to fall risk. Reinforce activity limits and safety measures with patient and family.

## 2023-04-06 NOTE — H&P ADULT - NSHPPHYSICALEXAM_GEN_ALL_CORE
Vital Signs Last 24 Hrs  T(C): 36.3 (06 Apr 2023 23:24), Max: 37.1 (06 Apr 2023 19:49)  T(F): 97.4 (06 Apr 2023 23:24), Max: 98.7 (06 Apr 2023 19:49)  HR: 69 (06 Apr 2023 23:24) (69 - 83)  BP: 123/82 (06 Apr 2023 23:24) (123/82 - 142/90)  BP(mean): --  RR: 20 (06 Apr 2023 23:24) (18 - 20)  SpO2: 96% (06 Apr 2023 23:24) (96% - 97%)    Parameters below as of 06 Apr 2023 23:24  Patient On (Oxygen Delivery Method): room air

## 2023-04-06 NOTE — ED ADULT NURSE REASSESSMENT NOTE - NS ED NURSE REASSESS COMMENT FT1
Pt sleeping, arouses to voice, AOx4, speaking coherently, sensory intact, slight weakness noted in left arm/leg, PERRLA, respirations even and unlabored on RA, skin warm and dry. Transport present to bring pt to 3 tower, pt placed in transport monitor.

## 2023-04-06 NOTE — ED PROVIDER NOTE - ATTENDING CONTRIBUTION TO CARE
58y M w/ hx inguinal hernia, +smoker, presents after fall. Pt says he was in the shower at 2 PM this afternoon when he felt dizzy and fell, hitting his head. Later noticed that he was too dizzy to walk. Of note, pt recently completed course of Augmentin for dental infection; reports symptoms have since improved. No fever. On exam, pt noted to have LUE/LLE drift. Code Stroke activated. Pt with NIHSS of 2. Outside tPA window. CT imaging obtained. Neuro consult placed. Treated with fluids, zofran, ASA. Admitted to SDU.

## 2023-04-06 NOTE — ED ADULT NURSE NOTE - OBJECTIVE STATEMENT
Applied
c/o CODE STROKE. Pt presents to ED after calling EMS with c/o being so dizzy he felt he couldn't walk. LMK approx 1400 when he developed sudden onset dizziness/HA/nauseous which lead to a syncopal episode. Pt hit head on shower door, woke up walked to his bed and took a nap, Upon waking up from the nap pt was so dizzy he felt he couldn't walk and called EMS. Pt reports slurred speech, however speaking clearly. Pt denies vision changes, V/.D, fevers, chills, sensory changes, CP, SOB. Pt AOx4, speaking coherently, sensory intact, o facial droop, no ataxia, no right arm/leg weakness, slight left arm/leg weakness, respirations even and unlabored on RA, skin warm and dry. CODE STROKE activated, pt brought to CT.

## 2023-04-06 NOTE — H&P ADULT - HISTORY OF PRESENT ILLNESS
57 y/o male with no PMH came to the ED s/p syncope. Most HPI as per ED, patient  sleeping, could not stay up to hold a conversion. Patient reported sudden onset of nausea, dizziness this afternoon, felt like he was going to pass out and eventually passed out, hit his head. He got up few minutes later and went to sleep. He woke up later, could not walk because he felt too dizzy to walk so he called EMS. As per ED, patient mentioned he speech is "delayed/ slurred" and feeling nauseous. He has no chest pain, shortness of breath, palpitation, fever, chills, diarrhea. In the ED, code stroke was called because he was noted to have pronator drift on LUE/LLE however, out of window to Tenecteplase.    59 y/o male with no PMH came to the ED s/p syncope. Most HPI as per ED, patient  sleeping, could not stay up to hold a conversion. Patient reported sudden onset of nausea, dizziness this afternoon, felt like he was going to pass out and eventually passed out, hit his head. He got up few minutes later and went to sleep. He woke up later, could not walk because he felt too dizzy to walk so he called EMS. As per ED, patient mentioned he speech is "delayed/ slurred" and feeling nauseous. He has no chest pain, shortness of breath, palpitation, fever, chills, diarrhea. In the ED, code stroke was called because he was noted to have pronator drift on LUE/LLE however, out of window to Tenecteplase.

## 2023-04-06 NOTE — H&P ADULT - ASSESSMENT
57 y/o male with no PMH came to the ED s/p syncope. Most HPI as per ED, patient  sleeping, could not stay up to hold a conversion. Patient reported sudden onset of nausea, dizziness this afternoon, felt like he was going to pass out and eventually passed out, hit his head. He got up few minutes later and went to sleep. He woke up later, could not walk because he felt too dizzy to walk so he called EMS. As per ED, patient mentioned he speech is "delayed/ slurred" and feeling nauseous.  In the ED, code stroke was called because he was noted to have pronator drift on LUE/LLE however, out of window to Tenecteplase.   CT head/neck angio: No acute infarct, hemorrhage, or space-occupying lesion identified. Questionable ischemic changes versus artifact in the brainstem.    Dizziness rule out stroke   Admit to stroke unit   CT head as noted above   Aspirin 81mg given in the ED, will continue   Atorvastatin 40mg   HbA1C and lipid profile with AM lab  MRI   Echo   Neuro checks q2h   PT eval in AM   Neurology consulted     Cigarette/substance abuse   U-tox positive for cocaine   Nicotine declined   Cessation advised     Leukocytosis   As per ED, patient was recently treated for tooth infection   Patient said he completed antibiotic   No sign of clear infection   Trend without antibiotic     Supportive   DVT prophylaxis: CSD (VTE 0)  Diet: puree (passed dysphagia screening)     Plan of care discussed with patient

## 2023-04-06 NOTE — ED PROVIDER NOTE - OBJECTIVE STATEMENT
58 year old male with history of R inguinal hernia surgery who presents following the fall. Patient states that this afternoon ~14:00 had a sudden onset of dizziness, nauseous, and felt pre syncopal. Then had LOC. Hit the back of head on shower dead. No anticoagulation. Got up, walked into bed, and fell asleep. When he woke up he called the ambulance because "I couldn't walk" because felt too dizzy. No medications given en route. Feeling nauseous. No vomiting, fevers, chills, chest pain, or shortness of breath. Smokes cigarettes. Alcohol never. No other drugs. 58 year old male with history of R inguinal hernia surgery who presents following the fall. Patient states that this afternoon ~14:00 had a sudden onset of dizziness, nauseous, and felt pre syncopal. Then had LOC. Hit the back of head on shower door. No anticoagulation. Got up, walked into bed, and fell asleep. When he woke up he called the ambulance because "I couldn't walk" because felt too dizzy. No medications given en route. Feeling nauseous and like his speech is "delayed" / "slurry." Also feeling tired, states that he has had a difficult week. No vomiting, fevers, chills, chest pain, or shortness of breath. Smokes cigarettes. Alcohol never. No other drugs.

## 2023-04-06 NOTE — ED PROVIDER NOTE - PHYSICAL EXAMINATION
Gen: Well appearing in NAD  Head: NC/AT  Neck: trachea midline  Resp:  No distress  Ext: no deformities  Neuro:  A&O appears non focal, see NIHSS   Skin:  Warm and dry as visualized  Psych: Calm, cooperative

## 2023-04-06 NOTE — ED ADULT NURSE NOTE - CHIEF COMPLAINT QUOTE
BIBEMS from home s/p fall in the shower this AM, in which pt hit his head. Pt endorses LOC. Denies anticoagulation medications, c/o neck and back pain, nausea and a headache.

## 2023-04-06 NOTE — ED ADULT NURSE NOTE - GLASGOW COMA SCALE: EYE OPENING
[FreeTextEntry1] : JEFFERY NAVA  is optimized for surgery. she  is to be extubated once fully awake and able to protect airway.  The patient is to be monitored in the recovery room. They might benefit for high flow oxygen or noninvasive ventilation to prevent or reverse atelectasis.  Patient is to be admitted to a monitored bed postoperatively.  Avoid oversedation.  JEFFERY is high risk for DVT and will require bimodal agents for DVT prophylaxis early mobilization is recommended. she is to use the incentive spirometry postoperative. \par \par Travel bronchial asthma\par \par The patient is clinically stable she not have any asthma attacks for years.  I instructed the patient that the asthma might come back if the triggered by viral illness or by anesthesia.  Patient has a  strong family history of bronchial asthma at this point no indication for bronchodilator therapy (E4) spontaneous

## 2023-04-07 ENCOUNTER — TRANSCRIPTION ENCOUNTER (OUTPATIENT)
Age: 58
End: 2023-04-07

## 2023-04-07 VITALS
SYSTOLIC BLOOD PRESSURE: 121 MMHG | RESPIRATION RATE: 20 BRPM | OXYGEN SATURATION: 98 % | HEART RATE: 68 BPM | DIASTOLIC BLOOD PRESSURE: 67 MMHG

## 2023-04-07 LAB
A1C WITH ESTIMATED AVERAGE GLUCOSE RESULT: 5.5 % — SIGNIFICANT CHANGE UP (ref 4–5.6)
CHOLEST SERPL-MCNC: 162 MG/DL — SIGNIFICANT CHANGE UP
ESTIMATED AVERAGE GLUCOSE: 111 MG/DL — SIGNIFICANT CHANGE UP (ref 68–114)
HDLC SERPL-MCNC: 62 MG/DL — SIGNIFICANT CHANGE UP
LIPID PNL WITH DIRECT LDL SERPL: 88 MG/DL — SIGNIFICANT CHANGE UP
NON HDL CHOLESTEROL: 100 MG/DL — SIGNIFICANT CHANGE UP
TRIGL SERPL-MCNC: 60 MG/DL — SIGNIFICANT CHANGE UP

## 2023-04-07 PROCEDURE — 93306 TTE W/DOPPLER COMPLETE: CPT | Mod: 26

## 2023-04-07 PROCEDURE — 99285 EMERGENCY DEPT VISIT HI MDM: CPT

## 2023-04-07 PROCEDURE — 84295 ASSAY OF SERUM SODIUM: CPT

## 2023-04-07 PROCEDURE — 85014 HEMATOCRIT: CPT

## 2023-04-07 PROCEDURE — 93005 ELECTROCARDIOGRAM TRACING: CPT

## 2023-04-07 PROCEDURE — 70498 CT ANGIOGRAPHY NECK: CPT | Mod: MA

## 2023-04-07 PROCEDURE — 36415 COLL VENOUS BLD VENIPUNCTURE: CPT

## 2023-04-07 PROCEDURE — 85610 PROTHROMBIN TIME: CPT

## 2023-04-07 PROCEDURE — 82553 CREATINE MB FRACTION: CPT

## 2023-04-07 PROCEDURE — 85730 THROMBOPLASTIN TIME PARTIAL: CPT

## 2023-04-07 PROCEDURE — 70496 CT ANGIOGRAPHY HEAD: CPT | Mod: MA

## 2023-04-07 PROCEDURE — 87637 SARSCOV2&INF A&B&RSV AMP PRB: CPT

## 2023-04-07 PROCEDURE — 96374 THER/PROPH/DIAG INJ IV PUSH: CPT

## 2023-04-07 PROCEDURE — 99223 1ST HOSP IP/OBS HIGH 75: CPT

## 2023-04-07 PROCEDURE — 85018 HEMOGLOBIN: CPT

## 2023-04-07 PROCEDURE — 80053 COMPREHEN METABOLIC PANEL: CPT

## 2023-04-07 PROCEDURE — 70450 CT HEAD/BRAIN W/O DYE: CPT | Mod: MA

## 2023-04-07 PROCEDURE — 85025 COMPLETE CBC W/AUTO DIFF WBC: CPT

## 2023-04-07 PROCEDURE — 82550 ASSAY OF CK (CPK): CPT

## 2023-04-07 PROCEDURE — 82435 ASSAY OF BLOOD CHLORIDE: CPT

## 2023-04-07 PROCEDURE — 82947 ASSAY GLUCOSE BLOOD QUANT: CPT

## 2023-04-07 PROCEDURE — 99239 HOSP IP/OBS DSCHRG MGMT >30: CPT

## 2023-04-07 PROCEDURE — 84484 ASSAY OF TROPONIN QUANT: CPT

## 2023-04-07 PROCEDURE — 86850 RBC ANTIBODY SCREEN: CPT

## 2023-04-07 PROCEDURE — 82962 GLUCOSE BLOOD TEST: CPT

## 2023-04-07 PROCEDURE — 70551 MRI BRAIN STEM W/O DYE: CPT

## 2023-04-07 PROCEDURE — 83036 HEMOGLOBIN GLYCOSYLATED A1C: CPT

## 2023-04-07 PROCEDURE — 83605 ASSAY OF LACTIC ACID: CPT

## 2023-04-07 PROCEDURE — 86901 BLOOD TYPING SEROLOGIC RH(D): CPT

## 2023-04-07 PROCEDURE — 86900 BLOOD TYPING SEROLOGIC ABO: CPT

## 2023-04-07 PROCEDURE — 82803 BLOOD GASES ANY COMBINATION: CPT

## 2023-04-07 PROCEDURE — 80061 LIPID PANEL: CPT

## 2023-04-07 PROCEDURE — 0042T: CPT | Mod: MA

## 2023-04-07 PROCEDURE — 80307 DRUG TEST PRSMV CHEM ANLYZR: CPT

## 2023-04-07 PROCEDURE — 81001 URINALYSIS AUTO W/SCOPE: CPT

## 2023-04-07 PROCEDURE — 82330 ASSAY OF CALCIUM: CPT

## 2023-04-07 PROCEDURE — 84132 ASSAY OF SERUM POTASSIUM: CPT

## 2023-04-07 PROCEDURE — C8929: CPT

## 2023-04-07 PROCEDURE — 70551 MRI BRAIN STEM W/O DYE: CPT | Mod: 26

## 2023-04-07 RX ORDER — LANOLIN ALCOHOL/MO/W.PET/CERES
1 CREAM (GRAM) TOPICAL
Qty: 0 | Refills: 0 | DISCHARGE
Start: 2023-04-07

## 2023-04-07 RX ADMIN — Medication 81 MILLIGRAM(S): at 12:51

## 2023-04-07 NOTE — DISCHARGE NOTE PROVIDER - PROVIDER TOKENS
PROVIDER:[TOKEN:[[id number]:[facility]:[id number]]] PROVIDER:[TOKEN:[[id number]:[facility]:[id number]]] PROVIDER:[TOKEN:[[id number]:[facility]:[id number]]]

## 2023-04-07 NOTE — DISCHARGE NOTE PROVIDER - HOSPITAL COURSE
57 y/o male with no PMH came to the ED s/p syncope. Most HPI as per ED, patient  sleeping, could not stay up to hold a conversion. Patient reported sudden onset of nausea, dizziness this afternoon, felt like he was going to pass out and eventually passed out, hit his head. He got up few minutes later and went to sleep. He woke up later, could not walk because he felt too dizzy to walk so he called EMS. As per ED, patient mentioned he speech is "delayed/ slurred" and feeling nauseous. He has no chest pain, shortness of breath, palpitation, fever, chills, diarrhea. In the ED, code stroke was called because he was noted to have pronator drift on LUE/LLE however, out of window to Tenecteplase.   CT head/neck angio: No acute infarct, hemorrhage, or space-occupying lesion identified. Questionable ischemic changes versus artifact in the brainstem.  Dizziness ruled out stroke mri negative  Aspirin 81mg given   Atorvastatin 40mg   Echo no thrombotic source no pfo  Neurology consulted Dw dr [place] cleared for discharge dizziness possibly cocaine related   Cigarette/substance abuse   U-tox positive for cocaine   Nicotine declined   Cessation advised   Leukocytosis denied any cough fever diarrhea dysuria rash   No sign of clear infection   Trend without antibiotic     GENERAL: NAD, well-groomed, well-developed sitting in bed eating steak and potatoes- states is very eager to "get out"  HEAD:  Atraumatic, Normocephalic  EYES: EOMI, PERRLA, conjunctiva and sclera clear  ENMT: No tonsillar erythema, exudates, or enlargement; Moist mucous membranes, Good dentition, No lesions  NECK: Supple, No JVD, Normal thyroid  NERVOUS SYSTEM:  Alert & Oriented X3, Good concentration; Motor Strength 5/5 B/L upper and lower extremities;   CHEST/LUNG: Clear to percussion bilaterally; No rales, rhonchi, wheezing, or rubs  HEART: Regular rate and rhythm; No murmurs, rubs, or gallops  ABDOMEN: Soft, Nontender, Nondistended; Bowel sounds present  EXTREMITIES:  2+ Peripheral Pulses, No clubbing, cyanosis, or edema  LYMPH: No lymphadenopathy noted  SKIN: No rashes or lesions

## 2023-04-07 NOTE — DISCHARGE NOTE NURSING/CASE MANAGEMENT/SOCIAL WORK - PATIENT PORTAL LINK FT
You can access the FollowMyHealth Patient Portal offered by [facility] by registering at the following website: [email or web address]. By joining [employer]’s FollowMyHealth portal, you will also be able to view your health information using other applications (apps) compatible with our system. You can access the FollowMyHealth Patient Portal offered by [facility] by registering at the following website: [email or web address]. By joining [employer]’s FollowMyHealth portal, you will also be able to view your health information using other applications (apps) compatible with our system. You can access the FollowMyHealth Patient Portal offered by [facility] by registering at the following website: [email or web address]. By joining [employer]’s FollowMyHealth portal, you will also be able to view your health information using other applications (apps) compatible with our system.

## 2023-04-07 NOTE — CONSULT NOTE ADULT - ASSESSMENT
The patient is a 58y Male with nausea, dizziness, and syncope in the setting of cocaine use.     Syncope.   Brain MRI negative for stroke.   Agree with checking echocardiogram.  Likely all symptoms secondary to cocaine.     Substance abuse.   Advised to discontinue.    No further specific neurologic recommendations. Will be available as needed.     Case discussed with Dr Sanchez.

## 2023-04-07 NOTE — DISCHARGE NOTE PROVIDER - NSDCPNSUBOBJ_GEN_ALL_CORE
LILIBETH TIERNEY Patient is a 58y old  Male who presents with a chief complaint of    HPI:  57 y/o male with no PMH came to the ED s/p syncope. Most HPI as per ED, patient  sleeping, could not stay up to hold a conversion. Patient reported sudden onset of nausea, dizziness this afternoon, felt like he was going to pass out and eventually passed out, hit his head. He got up few minutes later and went to sleep. He woke up later, could not walk because he felt too dizzy to walk so he called EMS. As per ED, patient mentioned he speech is "delayed/ slurred" and feeling nauseous. He has no chest pain, shortness of breath, palpitation, fever, chills, diarrhea. In the ED, code stroke was called because he was noted to have pronator drift on LUE/LLE however, out of window to Tenecteplase.    (2023 23:13)    The patient was seen and evaluated offers no complaint is eager to go home  The patient is in no acute distress.  Denied any fever chest pain, palpitations, shortness of breath, abdominal pain, fever, dysuria, cough, edema       Height (cm): 175.3 (06 @ 19:49)I&O's Summary    2023 07:01  -  2023 19:34  --------------------------------------------------------  IN: 500 mL / OUT: 850 mL / NET: -350 mL      Allergies    No Known Allergies    Intolerances      HEALTH ISSUES - PROBLEM Dx:        PAST MEDICAL & SURGICAL HISTORY:  No pertinent past medical history      S/P inguinal hernia repair              Vital Signs Last 24 Hrs  T(C): 37.4 (2023 15:20), Max: 37.4 (2023 15:20)  T(F): 99.4 (2023 15:20), Max: 99.4 (2023 15:20)  HR: 68 (2023 17:00) (60 - 83)  BP: 121/67 (2023 17:00) (107/74 - 142/90)  BP(mean): 85 (2023 16:00) (80 - 92)  RR: 20 (2023 17:00) (13 - 20)  SpO2: 98% (2023 17:00) (95% - 99%)    Parameters below as of 2023 17:00  Patient On (Oxygen Delivery Method): room air    T(C): 37.4 (23 @ 15:20), Max: 37.4 (23 @ 15:20)  HR: 68 (23 @ 17:00) (60 - 83)  BP: 121/67 (23 @ 17:00) (107/74 - 142/90)  RR: 20 (23 @ 17:00) (13 - 20)  SpO2: 98% (23 @ 17:00) (95% - 99%)  Wt(kg): --    PHYSICAL EXAM:    GENERAL: NAD  HEAD:  Atraumatic, Normocephalic  EYES: EOMI, PERRL, conjunctiva and sclera clear  ENMT:  Moist mucous membranes,  No lesions  NECK: Supple, No JVD, Normal thyroid  NERVOUS SYSTEM:  Alert & Oriented X3,  Moves upper and lower extremities; CNS-II-XII  CHEST/LUNG: Clear to auscultation bilaterally; No rales, rhonchi, wheezing,   HEART: Regular rate and rhythm; No murmurs,   ABDOMEN: Soft, Nontender, Nondistended; Bowel sounds present  EXTREMITIES:  Peripheral Pulses, No  cyanosis, or edema  SKIN: No rashes or lesions  psychiatry- mood and affect appropriate, Insight and judgement intact     acetaminophen     Tablet .. 650 milliGRAM(s) Oral every 6 hours PRN  aluminum hydroxide/magnesium hydroxide/simethicone Suspension 30 milliLiter(s) Oral every 4 hours PRN  aspirin  chewable 81 milliGRAM(s) Enteral Tube daily  atorvastatin 40 milliGRAM(s) Oral at bedtime  melatonin 3 milliGRAM(s) Oral at bedtime PRN  ondansetron Injectable 4 milliGRAM(s) IV Push every 8 hours PRN      LABS:                          12.7   14.57 )-----------( 370      ( 2023 20:25 )             39.1     04-06    138  |  104  |  16.7  ----------------------------<  117<H>  4.1   |  23.0  |  0.87    Ca    8.6      2023 20:25    TPro  6.9  /  Alb  4.4  /  TBili  <0.2<L>  /  DBili  x   /  AST  31  /  ALT  20  /  AlkPhos  66  04-06    LIVER FUNCTIONS - ( 2023 20:25 )  Alb: 4.4 g/dL / Pro: 6.9 g/dL / ALK PHOS: 66 U/L / ALT: 20 U/L / AST: 31 U/L / GGT: x           PT/INR - ( 2023 20:25 )   PT: 11.1 sec;   INR: 0.96 ratio         PTT - ( 2023 20:25 )  PTT:27.0 sec  CARDIAC MARKERS ( 2023 20:25 )  x     / <0.01 ng/mL / 335 U/L / x     / 5.7 ng/mL      Urinalysis Basic - ( 2023 21:18 )    Color: Yellow / Appearance: Clear / S.010 / pH: x  Gluc: x / Ketone: Trace  / Bili: Negative / Urobili: Negative mg/dL   Blood: x / Protein: 30 mg/dL / Nitrite: Negative   Leuk Esterase: Negative / RBC: 0-2 /HPF / WBC 0-2 /HPF   Sq Epi: x / Non Sq Epi: x / Bacteria: Occasional      CAPILLARY BLOOD GLUCOSE      POCT Blood Glucose.: 125 mg/dL (2023 20:25)      RADIOLOGY & ADDITIONAL TESTS:      Consultant notes reviewed    Case discussed with consultant/provider/ family /patient LILIBETH TIERNEY Patient is a 58y old  Male who presents with a chief complaint of    HPI:  59 y/o male with no PMH came to the ED s/p syncope. Most HPI as per ED, patient  sleeping, could not stay up to hold a conversion. Patient reported sudden onset of nausea, dizziness this afternoon, felt like he was going to pass out and eventually passed out, hit his head. He got up few minutes later and went to sleep. He woke up later, could not walk because he felt too dizzy to walk so he called EMS. As per ED, patient mentioned he speech is "delayed/ slurred" and feeling nauseous. He has no chest pain, shortness of breath, palpitation, fever, chills, diarrhea. In the ED, code stroke was called because he was noted to have pronator drift on LUE/LLE however, out of window to Tenecteplase.    (2023 23:13)    The patient was seen and evaluated offers no complaint is eager to go home  The patient is in no acute distress.  Denied any fever chest pain, palpitations, shortness of breath, abdominal pain, fever, dysuria, cough, edema       Height (cm): 175.3 (06 @ 19:49)I&O's Summary    2023 07:01  -  2023 19:34  --------------------------------------------------------  IN: 500 mL / OUT: 850 mL / NET: -350 mL      Allergies    No Known Allergies    Intolerances      HEALTH ISSUES - PROBLEM Dx:        PAST MEDICAL & SURGICAL HISTORY:  No pertinent past medical history      S/P inguinal hernia repair              Vital Signs Last 24 Hrs  T(C): 37.4 (2023 15:20), Max: 37.4 (2023 15:20)  T(F): 99.4 (2023 15:20), Max: 99.4 (2023 15:20)  HR: 68 (2023 17:00) (60 - 83)  BP: 121/67 (2023 17:00) (107/74 - 142/90)  BP(mean): 85 (2023 16:00) (80 - 92)  RR: 20 (2023 17:00) (13 - 20)  SpO2: 98% (2023 17:00) (95% - 99%)    Parameters below as of 2023 17:00  Patient On (Oxygen Delivery Method): room air    T(C): 37.4 (23 @ 15:20), Max: 37.4 (23 @ 15:20)  HR: 68 (23 @ 17:00) (60 - 83)  BP: 121/67 (23 @ 17:00) (107/74 - 142/90)  RR: 20 (23 @ 17:00) (13 - 20)  SpO2: 98% (23 @ 17:00) (95% - 99%)  Wt(kg): --    PHYSICAL EXAM:    GENERAL: NAD  HEAD:  Atraumatic, Normocephalic  EYES: EOMI, PERRL, conjunctiva and sclera clear  ENMT:  Moist mucous membranes,  No lesions  NECK: Supple, No JVD, Normal thyroid  NERVOUS SYSTEM:  Alert & Oriented X3,  Moves upper and lower extremities; CNS-II-XII  CHEST/LUNG: Clear to auscultation bilaterally; No rales, rhonchi, wheezing,   HEART: Regular rate and rhythm; No murmurs,   ABDOMEN: Soft, Nontender, Nondistended; Bowel sounds present  EXTREMITIES:  Peripheral Pulses, No  cyanosis, or edema  SKIN: No rashes or lesions  psychiatry- mood and affect appropriate, Insight and judgement intact     acetaminophen     Tablet .. 650 milliGRAM(s) Oral every 6 hours PRN  aluminum hydroxide/magnesium hydroxide/simethicone Suspension 30 milliLiter(s) Oral every 4 hours PRN  aspirin  chewable 81 milliGRAM(s) Enteral Tube daily  atorvastatin 40 milliGRAM(s) Oral at bedtime  melatonin 3 milliGRAM(s) Oral at bedtime PRN  ondansetron Injectable 4 milliGRAM(s) IV Push every 8 hours PRN      LABS:                          12.7   14.57 )-----------( 370      ( 2023 20:25 )             39.1     04-06    138  |  104  |  16.7  ----------------------------<  117<H>  4.1   |  23.0  |  0.87    Ca    8.6      2023 20:25    TPro  6.9  /  Alb  4.4  /  TBili  <0.2<L>  /  DBili  x   /  AST  31  /  ALT  20  /  AlkPhos  66  04-06    LIVER FUNCTIONS - ( 2023 20:25 )  Alb: 4.4 g/dL / Pro: 6.9 g/dL / ALK PHOS: 66 U/L / ALT: 20 U/L / AST: 31 U/L / GGT: x           PT/INR - ( 2023 20:25 )   PT: 11.1 sec;   INR: 0.96 ratio         PTT - ( 2023 20:25 )  PTT:27.0 sec  CARDIAC MARKERS ( 2023 20:25 )  x     / <0.01 ng/mL / 335 U/L / x     / 5.7 ng/mL      Urinalysis Basic - ( 2023 21:18 )    Color: Yellow / Appearance: Clear / S.010 / pH: x  Gluc: x / Ketone: Trace  / Bili: Negative / Urobili: Negative mg/dL   Blood: x / Protein: 30 mg/dL / Nitrite: Negative   Leuk Esterase: Negative / RBC: 0-2 /HPF / WBC 0-2 /HPF   Sq Epi: x / Non Sq Epi: x / Bacteria: Occasional      CAPILLARY BLOOD GLUCOSE      POCT Blood Glucose.: 125 mg/dL (2023 20:25)      RADIOLOGY & ADDITIONAL TESTS:      Consultant notes reviewed    Case discussed with consultant/provider/ family /patient

## 2023-04-07 NOTE — DISCHARGE NOTE PROVIDER - NSDCMRMEDTOKEN_GEN_ALL_CORE_FT
melatonin 3 mg oral tablet: 1 tab(s) orally once a day (at bedtime) As needed Insomnia  oxycodone-acetaminophen 5 mg-325 mg oral tablet: 1 tab(s) orally every 6 hours as needed for  moderate pain MDD: 4 tabs

## 2023-04-07 NOTE — PATIENT PROFILE ADULT - FALL HARM RISK - UNIVERSAL INTERVENTIONS
Bed in lowest position, wheels locked, appropriate side rails in place/Call bell, personal items and telephone in reach/Instruct patient to call for assistance before getting out of bed or chair/Non-slip footwear when patient is out of bed/[place] to call system/Physically safe environment - no spills, clutter or unnecessary equipment/Purposeful Proactive Rounding/Room/bathroom lighting operational, light cord in reach Bed in lowest position, wheels locked, appropriate side rails in place/Call bell, personal items and telephone in reach/Instruct patient to call for assistance before getting out of bed or chair/Non-slip footwear when patient is out of bed/[place] to call system/Physically safe environment - no spills, clutter or unnecessary equipment/Purposeful Proactive Rounding/Room/bathroom lighting operational, light cord in reach Bed in lowest position, wheels locked, appropriate side rails in place/Call bell, personal items and telephone in reach/Instruct patient to call for assistance before getting out of bed or chair/Non-slip footwear when patient is out of bed/[place] to call system/Physically safe environment - no spills, clutter or unnecessary equipment/Purposeful Proactive Rounding/Room/bathroom lighting operational, light cord in reach

## 2023-04-07 NOTE — CONSULT NOTE ADULT - SUBJECTIVE AND OBJECTIVE BOX
[facility]                                        Neu[facility] at [place]                                  Ludin Fenton, & Binh                                      [address]                                           [place], [place], [address]                                                [phone]        CC: Syncope and dizziness     HISTORY:  The patient is a 58y Male who developed nausea and dizziness followed by passing out. He hit his head but reports no significant headache.  He had difficulty walking on arrival which has improved.   Stroke code was called in the ER but he was not a candidate for IV thrombolysis as he was out of the time window.     PAST MEDICAL & SURGICAL HISTORY:  No pertinent past medical history  S/P inguinal hernia repair    MEDICATIONS  (STANDING):  aspirin  chewable 81 milliGRAM(s) Enteral Tube daily  atorvastatin 40 milliGRAM(s) Oral at bedtime    MEDICATIONS  (PRN):  acetaminophen     Tablet .. 650 milliGRAM(s) Oral every 6 hours PRN Temp greater or equal to 38C (100.4F), Mild Pain (1 - 3)  aluminum hydroxide/magnesium hydroxide/simethicone Suspension 30 milliLiter(s) Oral every 4 hours PRN Dyspepsia  melatonin 3 milliGRAM(s) Oral at bedtime PRN Insomnia  ondansetron Injectable 4 milliGRAM(s) IV Push every 8 hours PRN Nausea and/or Vomiting    Allergies  No Known Allergies    SOCIAL HISTORY:  Smoker.   No alcohol use.   Admits to cocaine use.    FAMILY HISTORY:  No pertinent family history in first degree relatives  No known family history of stroke or seizure.    ROS:  Constitutional: The patient denies fevers or weight changes.  Neuro: As per HPI.  Eyes: Denies blurry vision.  Ears/nose/throat: Denies Tinnitus.   Cardiac: Denies chest pain. Denies palpitations.  Respiratory: Denies shortness of breath.  GI: Denies abdominal pain, nausea, or vomiting.  : Denies change in urinary pattern.  Integumentary: Denies rash.  Psych: Denies recent mood changes.  Heme: denies easy bleeding/bruising.    Exam:  Vital Signs Last 24 Hrs  T(C): 36.6 (07 Apr 2023 07:27), Max: 37.1 (06 Apr 2023 19:49)  T(F): 97.9 (07 Apr 2023 07:27), Max: 98.7 (06 Apr 2023 19:49)  HR: 74 (07 Apr 2023 08:00) (60 - 83)  BP: 133/81 (07 Apr 2023 08:00) (114/71 - 142/90)  BP(mean): 92 (07 Apr 2023 08:00) (92 - 92)  RR: 15 (07 Apr 2023 08:00) (13 - 20)  SpO2: 95% (07 Apr 2023 08:00) (95% - 98%)    Parameters below as of 07 Apr 2023 08:00  Patient On (Oxygen Delivery Method): room air    General: NAD.   Carotid bruits absent.     Mental status: The patient is awake, alert, and fully oriented. There is no aphasia. Attention span is normal. Patient is aware of current events.     Cranial nerves: There is no papilledema. Pupils react symmetrically to light. There is no visual field deficit to confrontation. Extraocular motion is full with no nystagmus.  Facial sensation is intact. Facial musculature is symmetric. Palate elevates symmetrically. Tongue is midline.    Motor: There is normal bulk and tone.  Strength is 5/5 in the right arm and leg.   Strength is 5/5 in the left arm and leg.    Sensation: Intact to light touch and pin. There is no extinction to double simultaneous stimulation.    Reflexes: 2+ throughout and plantar responses are flexor.    Cerebellar: There is no dysmetria on finger to nose testing.    LABS:                         12.7   14.57 )-----------( 370      ( 06 Apr 2023 20:25 )             39.1       04-06    138  |  104  |  16.7  ----------------------------<  117<H>  4.1   |  23.0  |  0.87    Ca    8.6      06 Apr 2023 20:25    TPro  6.9  /  Alb  4.4  /  TBili  <0.2<L>  /  DBili  x   /  AST  31  /  ALT  20  /  AlkPhos  66  04-0  PT/INR - ( 06 Apr 2023 20:25 )   PT: 11.1 sec;   INR: 0.96 ratio    PTT - ( 06 Apr 2023 20:25 )  PTT:27.0 sec    RADIOLOGY   MRI brain images reviewed (and concur with report): There is no acute pathology.   There is mild ischemic white matter change.                                           [facility]                                        Neurology at [place]                                  Ludin Fenton, & Binh                                      [address]                                           [place], [place], [address]                                                [phone]        CC: Syncope and dizziness     HISTORY:  The patient is a 58y Male who developed nausea and dizziness followed by passing out. He hit his head but reports no significant headache.  He had difficulty walking on arrival which has improved.   Stroke code was called in the ER but he was not a candidate for IV thrombolysis as he was out of the time window.     PAST MEDICAL & SURGICAL HISTORY:  No pertinent past medical history  S/P inguinal hernia repair    MEDICATIONS  (STANDING):  aspirin  chewable 81 milliGRAM(s) Enteral Tube daily  atorvastatin 40 milliGRAM(s) Oral at bedtime    MEDICATIONS  (PRN):  acetaminophen     Tablet .. 650 milliGRAM(s) Oral every 6 hours PRN Temp greater or equal to 38C (100.4F), Mild Pain (1 - 3)  aluminum hydroxide/magnesium hydroxide/simethicone Suspension 30 milliLiter(s) Oral every 4 hours PRN Dyspepsia  melatonin 3 milliGRAM(s) Oral at bedtime PRN Insomnia  ondansetron Injectable 4 milliGRAM(s) IV Push every 8 hours PRN Nausea and/or Vomiting    Allergies  No Known Allergies    SOCIAL HISTORY:  Smoker.   No alcohol use.   Admits to cocaine use.    FAMILY HISTORY:  No pertinent family history in first degree relatives  No known family history of stroke or seizure.    ROS:  Constitutional: The patient denies fevers or weight changes.  Neuro: As per HPI.  Eyes: Denies blurry vision.  Ears/nose/throat: Denies Tinnitus.   Cardiac: Denies chest pain. Denies palpitations.  Respiratory: Denies shortness of breath.  GI: Denies abdominal pain, nausea, or vomiting.  : Denies change in urinary pattern.  Integumentary: Denies rash.  Psych: Denies recent mood changes.  Heme: denies easy bleeding/bruising.    Exam:  Vital Signs Last 24 Hrs  T(C): 36.6 (07 Apr 2023 07:27), Max: 37.1 (06 Apr 2023 19:49)  T(F): 97.9 (07 Apr 2023 07:27), Max: 98.7 (06 Apr 2023 19:49)  HR: 74 (07 Apr 2023 08:00) (60 - 83)  BP: 133/81 (07 Apr 2023 08:00) (114/71 - 142/90)  BP(mean): 92 (07 Apr 2023 08:00) (92 - 92)  RR: 15 (07 Apr 2023 08:00) (13 - 20)  SpO2: 95% (07 Apr 2023 08:00) (95% - 98%)    Parameters below as of 07 Apr 2023 08:00  Patient On (Oxygen Delivery Method): room air    General: NAD.   Carotid bruits absent.     Mental status: The patient is awake, alert, and fully oriented. There is no aphasia. Attention span is normal. Patient is aware of current events.     Cranial nerves: There is no papilledema. Pupils react symmetrically to light. There is no visual field deficit to confrontation. Extraocular motion is full with no nystagmus.  Facial sensation is intact. Facial musculature is symmetric. Palate elevates symmetrically. Tongue is midline.    Motor: There is normal bulk and tone.  Strength is 5/5 in the right arm and leg.   Strength is 5/5 in the left arm and leg.    Sensation: Intact to light touch and pin. There is no extinction to double simultaneous stimulation.    Reflexes: 2+ throughout and plantar responses are flexor.    Cerebellar: There is no dysmetria on finger to nose testing.    LABS:                         12.7   14.57 )-----------( 370      ( 06 Apr 2023 20:25 )             39.1       04-06    138  |  104  |  16.7  ----------------------------<  117<H>  4.1   |  23.0  |  0.87    Ca    8.6      06 Apr 2023 20:25    TPro  6.9  /  Alb  4.4  /  TBili  <0.2<L>  /  DBili  x   /  AST  31  /  ALT  20  /  AlkPhos  66  04-0  PT/INR - ( 06 Apr 2023 20:25 )   PT: 11.1 sec;   INR: 0.96 ratio    PTT - ( 06 Apr 2023 20:25 )  PTT:27.0 sec    RADIOLOGY   MRI brain images reviewed (and concur with report): There is no acute pathology.   There is mild ischemic white matter change.                                           [facility]                                        Neurology at [place]                                  Ludin Fenton, & Binh                                      [address]                                           [place], [place], [address]                                                [phone]        CC: Syncope and dizziness     HISTORY:  The patient is a 58y Male who developed nausea and dizziness followed by passing out. He hit his head but reports no significant headache.  He had difficulty walking on arrival which has improved.   Stroke code was called in the ER but he was not a candidate for IV thrombolysis as he was out of the time window.     PAST MEDICAL & SURGICAL HISTORY:  No pertinent past medical history  S/P inguinal hernia repair    MEDICATIONS  (STANDING):  aspirin  chewable 81 milliGRAM(s) Enteral Tube daily  atorvastatin 40 milliGRAM(s) Oral at bedtime    MEDICATIONS  (PRN):  acetaminophen     Tablet .. 650 milliGRAM(s) Oral every 6 hours PRN Temp greater or equal to 38C (100.4F), Mild Pain (1 - 3)  aluminum hydroxide/magnesium hydroxide/simethicone Suspension 30 milliLiter(s) Oral every 4 hours PRN Dyspepsia  melatonin 3 milliGRAM(s) Oral at bedtime PRN Insomnia  ondansetron Injectable 4 milliGRAM(s) IV Push every 8 hours PRN Nausea and/or Vomiting    Allergies  No Known Allergies    SOCIAL HISTORY:  Smoker.   No alcohol use.   Admits to cocaine use.    FAMILY HISTORY:  No pertinent family history in first degree relatives  No known family history of stroke or seizure.    ROS:  Constitutional: The patient denies fevers or weight changes.  Neuro: As per HPI.  Eyes: Denies blurry vision.  Ears/nose/throat: Denies Tinnitus.   Cardiac: Denies chest pain. Denies palpitations.  Respiratory: Denies shortness of breath.  GI: Denies abdominal pain, nausea, or vomiting.  : Denies change in urinary pattern.  Integumentary: Denies rash.  Psych: Denies recent mood changes.  Heme: denies easy bleeding/bruising.    Exam:  Vital Signs Last 24 Hrs  T(C): 36.6 (07 Apr 2023 07:27), Max: 37.1 (06 Apr 2023 19:49)  T(F): 97.9 (07 Apr 2023 07:27), Max: 98.7 (06 Apr 2023 19:49)  HR: 74 (07 Apr 2023 08:00) (60 - 83)  BP: 133/81 (07 Apr 2023 08:00) (114/71 - 142/90)  BP(mean): 92 (07 Apr 2023 08:00) (92 - 92)  RR: 15 (07 Apr 2023 08:00) (13 - 20)  SpO2: 95% (07 Apr 2023 08:00) (95% - 98%)    Parameters below as of 07 Apr 2023 08:00  Patient On (Oxygen Delivery Method): room air    General: NAD.   Carotid bruits absent.     Mental status: The patient is awake, alert, and fully oriented. There is no aphasia. Attention span is normal. Patient is aware of current events.     Cranial nerves: There is no papilledema. Pupils react symmetrically to light. There is no visual field deficit to confrontation. Extraocular motion is full with no nystagmus.  Facial sensation is intact. Facial musculature is symmetric. Palate elevates symmetrically. Tongue is midline.    Motor: There is normal bulk and tone.  Strength is 5/5 in the right arm and leg.   Strength is 5/5 in the left arm and leg.    Sensation: Intact to light touch and pin. There is no extinction to double simultaneous stimulation.    Reflexes: 2+ throughout and plantar responses are flexor.    Cerebellar: There is no dysmetria on finger to nose testing.    LABS:                         12.7   14.57 )-----------( 370      ( 06 Apr 2023 20:25 )             39.1       04-06    138  |  104  |  16.7  ----------------------------<  117<H>  4.1   |  23.0  |  0.87    Ca    8.6      06 Apr 2023 20:25    TPro  6.9  /  Alb  4.4  /  TBili  <0.2<L>  /  DBili  x   /  AST  31  /  ALT  20  /  AlkPhos  66  04-0  PT/INR - ( 06 Apr 2023 20:25 )   PT: 11.1 sec;   INR: 0.96 ratio    PTT - ( 06 Apr 2023 20:25 )  PTT:27.0 sec    RADIOLOGY   MRI brain images reviewed (and concur with report): There is no acute pathology.   There is mild ischemic white matter change.

## 2023-04-07 NOTE — DISCHARGE NOTE PROVIDER - NSDCCPCAREPLAN_GEN_ALL_CORE_FT
PRINCIPAL DISCHARGE DIAGNOSIS  Diagnosis: Acute cerebrovascular accident (CVA)  Assessment and Plan of Treatment:

## 2023-04-07 NOTE — DISCHARGE NOTE PROVIDER - CARE PROVIDER_API CALL
GHASSAN MADRID  Chiropractor  [address] [facility] [address]  [place] [address]  Phone: [phone]  Fax: ()-  Follow Up Time:    GHASSAN MADRID  Chiropractor  [address] [facility] [address]  [place] [address]  Phone: [phone]  Fax: ()-  Follow Up Time:    GHASSAN MADRID  Chiropractor  [address] [facility] [address]  [place] [address]  Phone: [phone]  Fax: ()-  Follow Up Time:

## 2023-04-07 NOTE — DISCHARGE NOTE NURSING/CASE MANAGEMENT/SOCIAL WORK - NSDCPEFALRISK_GEN_ALL_CORE
For information on Fall & Injury Prevention, visit: [email or web address]://[email or web address]/news/fall-prevention-protects-and-maintains-health-and-mobility OR  https://www.[facility]/news/fall-prevention-tips-to-avoid-injury OR  https://www.cdc.gov/steadi/patient.html For information on Fall & Injury Prevention, visit: https://www.[facility]/news/fall-prevention-protects-and-maintains-health-and-mobility OR  https://www.[facility]/news/fall-prevention-tips-to-avoid-injury OR  https://www.cdc.gov/steadi/patient.html For information on Fall & Injury Prevention, visit: https://www.[facility]/news/fall-prevention-protects-and-maintains-health-and-mobility OR  https://www.[facility]/news/fall-prevention-tips-to-avoid-injury OR  https://www.cdc.gov/steadi/patient.html

## 2023-11-17 NOTE — ED PROVIDER NOTE - WR ORDER STATUS 1
change of breathing pattern/oral hygiene/position upright (90Y)/cough/gurgly voice/fever/pneumonia/throat clearing/upper respiratory infection Resulted

## 2024-03-10 ENCOUNTER — EMERGENCY (EMERGENCY)
Facility: HOSPITAL | Age: 59
LOS: 1 days | Discharge: HOME | End: 2024-03-10
Attending: EMERGENCY MEDICINE
Payer: COMMERCIAL

## 2024-03-10 VITALS
WEIGHT: 160.06 LBS | TEMPERATURE: 98 F | SYSTOLIC BLOOD PRESSURE: 121 MMHG | RESPIRATION RATE: 18 BRPM | OXYGEN SATURATION: 97 % | HEART RATE: 93 BPM | DIASTOLIC BLOOD PRESSURE: 85 MMHG | HEIGHT: 69 IN

## 2024-03-10 DIAGNOSIS — Z98.890 OTHER SPECIFIED POSTPROCEDURAL STATES: Chronic | ICD-10-CM

## 2024-03-10 PROBLEM — Z78.9 OTHER SPECIFIED HEALTH STATUS: Chronic | Status: ACTIVE | Noted: 2023-03-25

## 2024-03-10 LAB
ALBUMIN SERPL ELPH-MCNC: 3.7 G/DL — SIGNIFICANT CHANGE UP (ref 3.3–5.2)
ALP SERPL-CCNC: 59 U/L — SIGNIFICANT CHANGE UP (ref 40–120)
ALT FLD-CCNC: 9 U/L — SIGNIFICANT CHANGE UP
ANION GAP SERPL CALC-SCNC: 14 MMOL/L — SIGNIFICANT CHANGE UP (ref 5–17)
APTT BLD: 28 SEC — SIGNIFICANT CHANGE UP (ref 24.5–35.6)
AST SERPL-CCNC: 18 U/L — SIGNIFICANT CHANGE UP
BASOPHILS # BLD AUTO: 0.09 K/UL — SIGNIFICANT CHANGE UP (ref 0–0.2)
BASOPHILS NFR BLD AUTO: 0.7 % — SIGNIFICANT CHANGE UP (ref 0–2)
BILIRUB SERPL-MCNC: <0.2 MG/DL — LOW (ref 0.4–2)
BLD GP AB SCN SERPL QL: SIGNIFICANT CHANGE UP
BUN SERPL-MCNC: 23.3 MG/DL — HIGH (ref 8–20)
CALCIUM SERPL-MCNC: 8.5 MG/DL — SIGNIFICANT CHANGE UP (ref 8.4–10.5)
CHLORIDE SERPL-SCNC: 104 MMOL/L — SIGNIFICANT CHANGE UP (ref 96–108)
CO2 SERPL-SCNC: 23 MMOL/L — SIGNIFICANT CHANGE UP (ref 22–29)
CREAT SERPL-MCNC: 0.91 MG/DL — SIGNIFICANT CHANGE UP (ref 0.5–1.3)
EGFR: 98 ML/MIN/1.73M2 — SIGNIFICANT CHANGE UP
EOSINOPHIL # BLD AUTO: 0.68 K/UL — HIGH (ref 0–0.5)
EOSINOPHIL NFR BLD AUTO: 5.2 % — SIGNIFICANT CHANGE UP (ref 0–6)
GLUCOSE SERPL-MCNC: 107 MG/DL — HIGH (ref 70–99)
HCT VFR BLD CALC: 35.3 % — LOW (ref 39–50)
HGB BLD-MCNC: 12 G/DL — LOW (ref 13–17)
IMM GRANULOCYTES NFR BLD AUTO: 0.2 % — SIGNIFICANT CHANGE UP (ref 0–0.9)
INR BLD: 1 RATIO — SIGNIFICANT CHANGE UP (ref 0.85–1.18)
LIDOCAIN IGE QN: 40 U/L — SIGNIFICANT CHANGE UP (ref 22–51)
LYMPHOCYTES # BLD AUTO: 2.84 K/UL — SIGNIFICANT CHANGE UP (ref 1–3.3)
LYMPHOCYTES # BLD AUTO: 21.9 % — SIGNIFICANT CHANGE UP (ref 13–44)
MCHC RBC-ENTMCNC: 31.4 PG — SIGNIFICANT CHANGE UP (ref 27–34)
MCHC RBC-ENTMCNC: 34 GM/DL — SIGNIFICANT CHANGE UP (ref 32–36)
MCV RBC AUTO: 92.4 FL — SIGNIFICANT CHANGE UP (ref 80–100)
MONOCYTES # BLD AUTO: 1.21 K/UL — HIGH (ref 0–0.9)
MONOCYTES NFR BLD AUTO: 9.3 % — SIGNIFICANT CHANGE UP (ref 2–14)
NEUTROPHILS # BLD AUTO: 8.14 K/UL — HIGH (ref 1.8–7.4)
NEUTROPHILS NFR BLD AUTO: 62.7 % — SIGNIFICANT CHANGE UP (ref 43–77)
PLATELET # BLD AUTO: 312 K/UL — SIGNIFICANT CHANGE UP (ref 150–400)
POTASSIUM SERPL-MCNC: 4.2 MMOL/L — SIGNIFICANT CHANGE UP (ref 3.5–5.3)
POTASSIUM SERPL-SCNC: 4.2 MMOL/L — SIGNIFICANT CHANGE UP (ref 3.5–5.3)
PROT SERPL-MCNC: 5.9 G/DL — LOW (ref 6.6–8.7)
PROTHROM AB SERPL-ACNC: 11.1 SEC — SIGNIFICANT CHANGE UP (ref 9.5–13)
RBC # BLD: 3.82 M/UL — LOW (ref 4.2–5.8)
RBC # FLD: 13.6 % — SIGNIFICANT CHANGE UP (ref 10.3–14.5)
SODIUM SERPL-SCNC: 141 MMOL/L — SIGNIFICANT CHANGE UP (ref 135–145)
WBC # BLD: 12.99 K/UL — HIGH (ref 3.8–10.5)
WBC # FLD AUTO: 12.99 K/UL — HIGH (ref 3.8–10.5)

## 2024-03-10 PROCEDURE — 86850 RBC ANTIBODY SCREEN: CPT

## 2024-03-10 PROCEDURE — 86901 BLOOD TYPING SEROLOGIC RH(D): CPT

## 2024-03-10 PROCEDURE — 80053 COMPREHEN METABOLIC PANEL: CPT

## 2024-03-10 PROCEDURE — 99285 EMERGENCY DEPT VISIT HI MDM: CPT | Mod: 25

## 2024-03-10 PROCEDURE — 85610 PROTHROMBIN TIME: CPT

## 2024-03-10 PROCEDURE — 74177 CT ABD & PELVIS W/CONTRAST: CPT | Mod: MC

## 2024-03-10 PROCEDURE — 85730 THROMBOPLASTIN TIME PARTIAL: CPT

## 2024-03-10 PROCEDURE — 85025 COMPLETE CBC W/AUTO DIFF WBC: CPT

## 2024-03-10 PROCEDURE — 99284 EMERGENCY DEPT VISIT MOD MDM: CPT | Mod: 25

## 2024-03-10 PROCEDURE — 36415 COLL VENOUS BLD VENIPUNCTURE: CPT

## 2024-03-10 PROCEDURE — 86900 BLOOD TYPING SEROLOGIC ABO: CPT

## 2024-03-10 PROCEDURE — 74177 CT ABD & PELVIS W/CONTRAST: CPT | Mod: 26,MC

## 2024-03-10 PROCEDURE — 96374 THER/PROPH/DIAG INJ IV PUSH: CPT | Mod: XU

## 2024-03-10 PROCEDURE — 83690 ASSAY OF LIPASE: CPT

## 2024-03-10 PROCEDURE — 96375 TX/PRO/DX INJ NEW DRUG ADDON: CPT

## 2024-03-10 RX ORDER — ACETAMINOPHEN 500 MG
1000 TABLET ORAL ONCE
Refills: 0 | Status: COMPLETED | OUTPATIENT
Start: 2024-03-10 | End: 2024-03-10

## 2024-03-10 RX ORDER — MORPHINE SULFATE 50 MG/1
4 CAPSULE, EXTENDED RELEASE ORAL ONCE
Refills: 0 | Status: DISCONTINUED | OUTPATIENT
Start: 2024-03-10 | End: 2024-03-10

## 2024-03-10 RX ADMIN — MORPHINE SULFATE 4 MILLIGRAM(S): 50 CAPSULE, EXTENDED RELEASE ORAL at 04:27

## 2024-03-10 RX ADMIN — Medication 400 MILLIGRAM(S): at 04:26

## 2024-03-10 NOTE — ED PROVIDER NOTE - OBJECTIVE STATEMENT
Pt is a 60 yo male with PMH of R inguinal hernia s/p repair presenting with bl groin pain. Pt reports that he had a right inguinal recurrent hernia diagnosed a year ago and has been having intermittent pain. Pt was at work tonight and lifting heavy objects when he felt a "strain" in bl groins. Pt was walking after the incident and felt the pain worsening. Pt denies any N/V/D, fever, chills, SOB, chest pain.

## 2024-03-10 NOTE — ED ADULT NURSE NOTE - OBJECTIVE STATEMENT
pt. is aaox4. states he was lifting something heavy and started having groin pain. hx of hernias. iv access obtained, blood sent, pain meds administered, pending ct.

## 2024-03-10 NOTE — ED PROVIDER NOTE - CLINICAL SUMMARY MEDICAL DECISION MAKING FREE TEXT BOX
Diff dx: reducible hernia, incarcerated hernia, muscle strain    Pt has known hx of R inguinal hernia recurrent s/p hernia repair, pt has tenderness in bl groins on exam, R inguinal bulge. no skin changes, no N/V.  overall unlikely to be incarcerated.     CBC, CMP, lipase, coags, ct abd pelvis to further evaluate for  bilateral inguinal hernia, reducible vs incarcerated. Diff dx: reducible hernia, incarcerated hernia, muscle strain    Pt has known hx of R inguinal hernia recurrent s/p hernia repair, pt has tenderness in bl groins on exam, R inguinal bulge. no skin changes, no N/V.  overall unlikely to be incarcerated.     CBC, CMP, lipase, coags, ct abd pelvis to further evaluate for  bilateral inguinal hernia, reducible vs incarcerated. CT results show small fat containing hernia on L and protrusion of bladder toward right inguinal canal. Discussed with pt, needs gen surgery outpt followup.

## 2024-03-10 NOTE — ED PROVIDER NOTE - CARE PROVIDER_API CALL
Grant Astudillo  Vascular Surgery  [address] [place] [address], [address]  [place] [address]  Phone: [phone]  Fax: [phone]  Follow Up Time:

## 2024-03-10 NOTE — ED PROVIDER NOTE - CARE PROVIDERS DIRECT ADDRESSES
,DirectAddress_Unknown Metronidazole Counseling:  I discussed with the patient the risks of metronidazole including but not limited to seizures, nausea/vomiting, a metallic taste in the mouth, nausea/vomiting and severe allergy.

## 2024-03-10 NOTE — ED PROVIDER NOTE - MUSCULOSKELETAL, MLM
Spine appears normal, range of motion is not limited. bilateral groin tenderness R>L, reducible bulge on R, no bulge palpable on L

## 2024-03-10 NOTE — ED PROVIDER NOTE - NSFOLLOWUPINSTRUCTIONS_ED_ALL_ED_FT
Hernia    Please follow up with a general surgeon in the next few days.    A hernia is when fat or the intestines push through a weak area in the abdominal wall. This can occur around the belly button (umbilical hernia) or where the leg meets the lower abdomen (inguinal hernia). This creates a rounded lump (bulge). Hernias that can be pushed back into the belly are called reducible and those that cannot are called incarcerated. Hernias that are not reducible and lose their blood supply are called strangulated and require emergency surgery. Hernias can be caused or worsened when straining during bowel movements or lifting heavy objects as well as coughing or sneezing. Surgery may be helpful in treating this condition so follow up with a surgeon.    SEEK IMMEDIATE MEDICAL CARE IF YOU HAVE ANY OF THE FOLLOWING SYMPTOMS: worsening abdominal pain, change in color over the hernia, nausea/vomiting, or inability to have a bowel movement or pass gas.

## 2024-03-10 NOTE — ED ADULT NURSE NOTE - NSFALLRISK_ED_ALL_ED
Physical Therapy                 Therapy Communication Note    Patient Name: Chevy Benton  MRN: [id number]  Today's Date: 2/28/2024     Discipline: Physical Therapy    Missed Visit Reason: Missed Visit Reason: Other (Comment) (per RN pt off the floor for dialysis, pt should DC after)    Missed Time: Attempt    Comment:   No

## 2024-03-10 NOTE — ED PROVIDER NOTE - PATIENT PORTAL LINK FT
You can access the FollowMyHealth Patient Portal offered by [facility] by registering at the following website: [email or web address]. By joining [facility]’s FollowMyHealth portal, you will also be able to view your health information using other applications (apps) compatible with our system.

## 2024-04-03 NOTE — ED PROVIDER NOTE - CLINICAL SUMMARY MEDICAL DECISION MAKING FREE TEXT BOX
Attending
58 year old male with hx of R inguinal hernia repair who presents with symptoms concerning for stroke following head trauma. NIHSS of 2 for LLE and LUE weakness (+pronator drift). Notes inability to ambulate 2/2 dizziness/nausea. Outside TPA. Code Stroke initiated.

## 2024-04-17 DIAGNOSIS — Z12.11 ENCOUNTER FOR SCREENING FOR MALIGNANT NEOPLASM OF COLON: ICD-10-CM

## 2024-10-09 ENCOUNTER — NON-APPOINTMENT (OUTPATIENT)
Age: 59
End: 2024-10-09